# Patient Record
Sex: MALE | Race: OTHER | Employment: OTHER | URBAN - NONMETROPOLITAN AREA
[De-identification: names, ages, dates, MRNs, and addresses within clinical notes are randomized per-mention and may not be internally consistent; named-entity substitution may affect disease eponyms.]

---

## 2021-02-19 ENCOUNTER — APPOINTMENT (OUTPATIENT)
Dept: CT IMAGING | Age: 41
End: 2021-02-19

## 2021-02-19 ENCOUNTER — HOSPITAL ENCOUNTER (EMERGENCY)
Age: 41
Discharge: HOME | End: 2021-02-20
Attending: EMERGENCY MEDICINE

## 2021-02-19 ENCOUNTER — APPOINTMENT (OUTPATIENT)
Dept: GENERAL RADIOLOGY | Age: 41
End: 2021-02-19

## 2021-02-19 DIAGNOSIS — R07.9 CHEST PAIN, UNSPECIFIED TYPE: Primary | ICD-10-CM

## 2021-02-19 DIAGNOSIS — R74.8 ELEVATED LIPASE: ICD-10-CM

## 2021-02-19 LAB
ALBUMIN SERPL-MCNC: 4.4 G/DL (ref 3.5–5.2)
ALP BLD-CCNC: 67 U/L (ref 40–130)
ALT SERPL-CCNC: 20 U/L (ref 5–41)
ANION GAP SERPL CALCULATED.3IONS-SCNC: 10 MMOL/L (ref 7–19)
AST SERPL-CCNC: 23 U/L (ref 5–40)
BASOPHILS ABSOLUTE: 0.1 K/UL (ref 0–0.2)
BASOPHILS RELATIVE PERCENT: 0.7 % (ref 0–1)
BILIRUB SERPL-MCNC: 0.4 MG/DL (ref 0.2–1.2)
BUN BLDV-MCNC: 13 MG/DL (ref 6–20)
CALCIUM SERPL-MCNC: 8.4 MG/DL (ref 8.6–10)
CHLORIDE BLD-SCNC: 103 MMOL/L (ref 98–111)
CO2: 23 MMOL/L (ref 22–29)
CREAT SERPL-MCNC: 0.8 MG/DL (ref 0.5–1.2)
EOSINOPHILS ABSOLUTE: 0.2 K/UL (ref 0–0.6)
EOSINOPHILS RELATIVE PERCENT: 2.6 % (ref 0–5)
GFR AFRICAN AMERICAN: >59
GFR NON-AFRICAN AMERICAN: >60
GLUCOSE BLD-MCNC: 157 MG/DL (ref 74–109)
HCT VFR BLD CALC: 45.5 % (ref 42–52)
HEMOGLOBIN: 15.4 G/DL (ref 14–18)
IMMATURE GRANULOCYTES #: 0 K/UL
LIPASE: 228 U/L (ref 13–60)
LYMPHOCYTES ABSOLUTE: 3.3 K/UL (ref 1.1–4.5)
LYMPHOCYTES RELATIVE PERCENT: 43.6 % (ref 20–40)
MCH RBC QN AUTO: 32 PG (ref 27–31)
MCHC RBC AUTO-ENTMCNC: 33.8 G/DL (ref 33–37)
MCV RBC AUTO: 94.4 FL (ref 80–94)
MONOCYTES ABSOLUTE: 0.5 K/UL (ref 0–0.9)
MONOCYTES RELATIVE PERCENT: 7 % (ref 0–10)
NEUTROPHILS ABSOLUTE: 3.5 K/UL (ref 1.5–7.5)
NEUTROPHILS RELATIVE PERCENT: 46 % (ref 50–65)
PDW BLD-RTO: 12.4 % (ref 11.5–14.5)
PLATELET # BLD: 297 K/UL (ref 130–400)
PMV BLD AUTO: 9.9 FL (ref 9.4–12.4)
POTASSIUM SERPL-SCNC: 3.6 MMOL/L (ref 3.5–5)
RBC # BLD: 4.82 M/UL (ref 4.7–6.1)
SODIUM BLD-SCNC: 136 MMOL/L (ref 136–145)
TOTAL PROTEIN: 7.3 G/DL (ref 6.6–8.7)
TROPONIN: <0.01 NG/ML (ref 0–0.03)
WBC # BLD: 7.6 K/UL (ref 4.8–10.8)

## 2021-02-19 PROCEDURE — 83690 ASSAY OF LIPASE: CPT

## 2021-02-19 PROCEDURE — 6360000004 HC RX CONTRAST MEDICATION: Performed by: EMERGENCY MEDICINE

## 2021-02-19 PROCEDURE — 74177 CT ABD & PELVIS W/CONTRAST: CPT

## 2021-02-19 PROCEDURE — 80053 COMPREHEN METABOLIC PANEL: CPT

## 2021-02-19 PROCEDURE — 6370000000 HC RX 637 (ALT 250 FOR IP): Performed by: EMERGENCY MEDICINE

## 2021-02-19 PROCEDURE — 36415 COLL VENOUS BLD VENIPUNCTURE: CPT

## 2021-02-19 PROCEDURE — 84484 ASSAY OF TROPONIN QUANT: CPT

## 2021-02-19 PROCEDURE — 85025 COMPLETE CBC W/AUTO DIFF WBC: CPT

## 2021-02-19 PROCEDURE — 99285 EMERGENCY DEPT VISIT HI MDM: CPT

## 2021-02-19 PROCEDURE — 71045 X-RAY EXAM CHEST 1 VIEW: CPT

## 2021-02-19 RX ORDER — ASPIRIN 81 MG/1
324 TABLET, CHEWABLE ORAL ONCE
Status: COMPLETED | OUTPATIENT
Start: 2021-02-19 | End: 2021-02-19

## 2021-02-19 RX ADMIN — ASPIRIN 324 MG: 81 TABLET, CHEWABLE ORAL at 22:49

## 2021-02-19 RX ADMIN — IOPAMIDOL 90 ML: 755 INJECTION, SOLUTION INTRAVENOUS at 23:36

## 2021-02-19 ASSESSMENT — ENCOUNTER SYMPTOMS
VOMITING: 0
COUGH: 0
RHINORRHEA: 0
EYE PAIN: 0
DIARRHEA: 0
VOICE CHANGE: 0
EYE REDNESS: 0
SHORTNESS OF BREATH: 0

## 2021-02-19 ASSESSMENT — PAIN SCALES - GENERAL: PAINLEVEL_OUTOF10: 0

## 2021-02-20 VITALS
WEIGHT: 160 LBS | OXYGEN SATURATION: 98 % | SYSTOLIC BLOOD PRESSURE: 128 MMHG | HEIGHT: 66 IN | BODY MASS INDEX: 25.71 KG/M2 | HEART RATE: 78 BPM | TEMPERATURE: 98 F | RESPIRATION RATE: 20 BRPM | DIASTOLIC BLOOD PRESSURE: 78 MMHG

## 2021-02-20 LAB — TROPONIN: <0.01 NG/ML (ref 0–0.03)

## 2021-02-20 PROCEDURE — 36415 COLL VENOUS BLD VENIPUNCTURE: CPT

## 2021-02-20 PROCEDURE — 93005 ELECTROCARDIOGRAM TRACING: CPT | Performed by: EMERGENCY MEDICINE

## 2021-02-20 PROCEDURE — 84484 ASSAY OF TROPONIN QUANT: CPT

## 2021-02-20 RX ORDER — PANTOPRAZOLE SODIUM 20 MG/1
20 TABLET, DELAYED RELEASE ORAL DAILY
Qty: 30 TABLET | Refills: 0 | Status: SHIPPED | OUTPATIENT
Start: 2021-02-20

## 2021-02-20 ASSESSMENT — PAIN SCALES - GENERAL: PAINLEVEL_OUTOF10: 0

## 2021-02-20 NOTE — ED PROVIDER NOTES
[facility] EMERGENCY DEPT  EMERGENCY DEPARTMENT ENCOUNTER      Pt Name: Paige Vega  MRN: [id number]  [place] 1980  Date of evaluation: 2/19/2021  Provider: Raisa Nelson MD    CHIEF COMPLAINT       Chief Complaint   Patient presents with    Chest Pain     started today 1800    Nausea         HISTORY OF PRESENT ILLNESS   (Location/Symptom, Timing/Onset,Context/Setting, Quality, Duration, Modifying Factors, Severity)  Note limiting factors. Paige Vega is a 47 y.o. male who presents to the emergency department with complaint of pain in the left upper chest.  States it started about 2 hours ago while he was laying in bed. States he got up and went into the kitchen and drank some Pepsi and it seemed to get better but then when he went and got back in bed again at return for a few minutes. Pain has since resolved. Denies any associated shortness of breath, nausea, vomiting, or other symptoms. Patient has no medical history and takes no medications. Does not smoke. Reports occasional alcohol use but never more than 2 beers at a time. No previous abdominal surgeries. HPI    NursingNotes were reviewed. REVIEW OF SYSTEMS    (2-9 systems for level 4, 10 or more for level 5)     Review of Systems   Constitutional: Negative for fatigue and fever. HENT: Negative for congestion, rhinorrhea and voice change. Eyes: Negative for pain and redness. Respiratory: Negative for cough and shortness of breath. Cardiovascular: Positive for chest pain. Gastrointestinal: Negative for diarrhea and vomiting. Endocrine: Negative. Genitourinary: Negative. Musculoskeletal: Negative for arthralgias and gait problem. Skin: Negative for rash and wound. Neurological: Negative for weakness and headaches. Hematological: Negative. Psychiatric/Behavioral: Negative. All other systems reviewed and are negative. A complete review of systems was performed and is negative except as noted above in the HPI. PAST MEDICAL HISTORY     Past Medical History:   Diagnosis Date    Asthma          SURGICAL HISTORY     History reviewed. No pertinent surgical history. CURRENT MEDICATIONS       Discharge Medication List as of 2/20/2021  1:20 AM          ALLERGIES     Patient has no known allergies. FAMILY HISTORY     History reviewed. No pertinent family history.        SOCIAL HISTORY       Social History     Socioeconomic History    Marital status: Single     Spouse name: None    Number of children: None    Years of education: None    Highest education level: None   Occupational History    None   Social Needs    Financial resource strain: None    Food insecurity     Worry: None     Inability: None    Transportation needs     Medical: None     Non-medical: None   Tobacco Use    Smoking status: Never Smoker    Smokeless tobacco: Never Used   Substance and Sexual Activity    Alcohol use: Not Currently    Drug use: Not Currently    Sexual activity: Not Currently   Lifestyle    Physical activity     Days per week: None     Minutes per session: None    Stress: None   Relationships    Social connections     Talks on phone: None     Gets together: None     Attends Church service: None     Active member of club or organization: None     Attends meetings of clubs or organizations: None     Relationship status: None    Intimate partner violence     Fear of current or ex partner: None     Emotionally abused: None     Physically abused: None     Forced sexual activity: None   Other Topics Concern    None   Social History Narrative    None       SCREENINGS             PHYSICAL EXAM    (up to 7 for level 4, 8 or more for level 5)     ED Triage Vitals   BP Temp Temp src Pulse Resp SpO2 Height Weight   02/19/21 2244 02/19/21 2244 -- 02/19/21 2244 02/19/21 2244 02/19/21 2244 02/19/21 2232 02/19/21 2232   (!) 156/76 98 °F (36.7 °C)  78 20 98 % 5' 5.5\" (1.664 m) 160 lb (72.6 kg)       Physical Exam  Vitals signs and nursing note reviewed. Constitutional:       General: He is not in acute distress. Appearance: He is well-developed. He is not diaphoretic. HENT:      Head: Normocephalic and atraumatic. Eyes:      General: No scleral icterus. Neck:      Vascular: No JVD. Cardiovascular:      Rate and Rhythm: Normal rate and regular rhythm. Pulses:           Radial pulses are 2+ on the right side and 2+ on the left side. Dorsalis pedis pulses are 2+ on the right side and 2+ on the left side. Heart sounds: Normal heart sounds. No murmur. No friction rub. No gallop. Pulmonary:      Effort: Pulmonary effort is normal. No accessory muscle usage or respiratory distress. Breath sounds: Normal breath sounds. No stridor. No decreased breath sounds, wheezing, rhonchi or rales. Chest:      Chest wall: No tenderness. Abdominal:      General: There is no distension. Palpations: Abdomen is soft. Tenderness: There is no abdominal tenderness. There is no guarding or rebound. Musculoskeletal: Normal range of motion. General: No deformity. Right lower leg: No edema. Left lower leg: No edema. Skin:     General: Skin is warm and dry. Coloration: Skin is not pale. Findings: No erythema. Neurological:      Mental Status: He is alert and oriented to person, place, and time. GCS: GCS eye subscore is 4. GCS verbal subscore is 5. GCS motor subscore is 6. Cranial Nerves: No cranial nerve deficit. Motor: No abnormal muscle tone.       Coordination: Coordination normal.   Psychiatric:         Behavior: Behavior normal.         Judgment: Judgment normal.         DIAGNOSTIC RESULTS     EKG: All EKG's are interpreted by the Emergency Department Physician who either signs or Co-signs this chart in the absence of a cardiologist.      RADIOLOGY:   Non-plain film images such as CT, Ultrasound and MRI are read by the radiologist. Plainradiographic images are visualized and preliminarily interpreted by the emergency physician with the below findings:        Interpretation per the Radiologist below, if available at the time of this note:    XR CHEST PORTABLE    (Results Pending)   CT ABDOMEN PELVIS W IV CONTRAST Additional Contrast? None    (Results Pending)         ED BEDSIDE ULTRASOUND:   Performed by ED Physician - none    LABS:  Labs Reviewed   CBC WITH AUTO DIFFERENTIAL - Abnormal; Notable for the following components:       Result Value    MCV 94.4 (*)     MCH 32.0 (*)     Neutrophils % 46.0 (*)     Lymphocytes % 43.6 (*)     All other components within normal limits   COMPREHENSIVE METABOLIC PANEL - Abnormal; Notable for the following components:    Glucose 157 (*)     Calcium 8.4 (*)     All other components within normal limits   LIPASE - Abnormal; Notable for the following components:    Lipase 228 (*)     All other components within normal limits   TROPONIN   TROPONIN       All other labs were within normal range or not returned as of this dictation. Medications   aspirin chewable tablet 324 mg (324 mg Oral Given 2/19/21 2249)   iopamidol (ISOVUE-370) 76 % injection 90 mL (90 mLs Intravenous Given 2/19/21 2336)       EMERGENCY DEPARTMENT COURSE and DIFFERENTIALDIAGNOSIS/MDM:   Vitals:    Vitals:    02/19/21 2244 02/20/21 0007 02/20/21 0031 02/20/21 0117   BP: (!) 156/76 138/78 137/84 128/78   Pulse: 78 80 64 78   Resp: 20 20 20 20   Temp: 98 °F (36.7 °C)   98 °F (36.7 °C)   SpO2: 98% 99% 96% 98%   Weight:       Height:           MDM    ED Course as of Feb 20 0200 Fri Feb 19, 2021   2353 EKG shows sinus rhythm with a rate of 88. Slight ST depression present in leads II and aVF with no ST elevation or other ischemic findings present. Heart score of 1 for EKG findings. Patient is young and without any cardiac risk factors. Pain is not classic angina without change with exertion.   Lipase level is mildly elevated over 200 though patient does not have any abdominal pain or tenderness. [BAYRON]   Sat Feb 20, 2021   0007 Preliminary Findings Only - See Final Report For Complete Findings  CT ABDOMEN & PELVIS With Contrast:    No radiographic evidence of pancreatitis. No radiodense gallstones. No hydronephrosis. Probable small low-attenuation focus in the right kidney  Appendix not identified. No colitis. Nonobstructive bowel gas pattern. Small fat-containing umbilical hernia. Radiologist: Sofie Lopez M.D.    [BAYRON]   9211 Repeat EKG shows sinus rhythm with a rate of 62. No findings of acute ischemia, infarction, or abnormal intervals. normal EKG    [BAYRON]   0123 Patient with no return in symptoms. CT of the abdomen pelvis is unremarkable. Repeat EKG and troponin negative. Given the low risk heart score and reassuring work-up otherwise, feel patient is stable for discharge home. He is agreeable to return precautions. Does not have a primary care doctor and referrals placed for outpatient follow-up. [BAYRON]      ED Course User Index  [BAYRON] Iram Arias MD     Evaluation and work-up here revealed no signs of emergent or life-threatening pathology that would necessitate admission for further work-up or management at this time. Patient is felt to be stable for discharge home with return precautions for worsening of the condition or development of new concerning symptoms. Patient was encouraged to follow-up with their primary care doctor in the appropriate timeframe. Necessary prescriptions and information have been provided for treatment at home. Patient voices understanding and agreement with the plan. CONSULTS:  None    PROCEDURES:  Unless otherwise notedbelow, none     Procedures      FINAL IMPRESSION     1. Chest pain, unspecified type    2.  Elevated lipase          DISPOSITION/PLAN   DISPOSITION        PATIENT REFERRED TO:  [facility] EM[facility]GENC[facility] DEPT  [facility]  [phone]    If symptoms worsen    [address]   [place] [id number]  [phone]  Schedule an appointment as soon as possible for a visit in 1 week        DISCHARGE MEDICATIONS:  Discharge Medication List as of 2/20/2021  1:20 AM      START taking these medications    Details   pantoprazole (PROTONIX) 20 MG tablet Take 1 tablet by mouth daily, Disp-30 tablet, R-0Normal                (Please note that portions of this note were completed with a voice recognition program.  Efforts were made to edit the dictations butoccasionally words are mis-transcribed.)    Poonam Anaya MD (electronically signed)  AttendingEmergency Physician          Poonam Anaya., MD  02/20/21 0789

## 2021-02-20 NOTE — ED NOTES
Patient placed in a gown Patient placed on cardiac monitor, continuous pulse oximeter, and NIBP monitor.  Monitor alarms on.       Siddharth Najera RN  02/19/21 8474

## 2021-02-23 LAB
EKG P AXIS: 38 DEGREES
EKG P-R INTERVAL: 170 MS
EKG Q-T INTERVAL: 366 MS
EKG QRS DURATION: 82 MS
EKG QTC CALCULATION (BAZETT): 367 MS
EKG T AXIS: 35 DEGREES

## 2021-02-23 PROCEDURE — 93010 ELECTROCARDIOGRAM REPORT: CPT | Performed by: INTERNAL MEDICINE

## 2021-02-24 LAB
EKG P AXIS: 60 DEGREES
EKG P-R INTERVAL: 158 MS
EKG Q-T INTERVAL: 316 MS
EKG QRS DURATION: 82 MS
EKG QTC CALCULATION (BAZETT): 361 MS
EKG T AXIS: 31 DEGREES